# Patient Record
Sex: MALE
[De-identification: names, ages, dates, MRNs, and addresses within clinical notes are randomized per-mention and may not be internally consistent; named-entity substitution may affect disease eponyms.]

---

## 2020-10-19 ENCOUNTER — HOSPITAL ENCOUNTER (OUTPATIENT)
Dept: HOSPITAL 75 - RAD | Age: 45
End: 2020-10-19
Attending: NURSE PRACTITIONER
Payer: SELF-PAY

## 2020-10-19 DIAGNOSIS — N20.0: Primary | ICD-10-CM

## 2020-10-19 PROCEDURE — 74176 CT ABD & PELVIS W/O CONTRAST: CPT

## 2020-10-19 NOTE — DIAGNOSTIC IMAGING REPORT
PROCEDURE: CT abdomen and pelvis without contrast.



TECHNIQUE: Multiple contiguous axial images were obtained through

the abdomen and pelvis without the use of intravenous contrast.

Auto Exposure Controls were utilized during the CT exam to meet

ALARA standards for radiation dose reduction. 



INDICATION:  Flank pain.



FINDINGS: The heart size is normal. The lung bases are clear. The

liver is normal in size without focal lesions. Gallbladder is

unremarkable. There is no biliary ductal dilatation. Spleen is

normal. Pancreas and adrenal glands are unremarkable. There are

several nonobstructing stones in the left kidney. There are no

stones in the right kidney. There is no evidence of obstructive

uropathy. Aorta is nonaneurysmal. Bowel gas pattern is

nonspecific. Appendix is normal. There is no free air. There is

no ascites. There are no focal inflammatory changes. Bladder is

normal. There is no pelvic mass or adenopathy. There are mild

degenerative changes in the spine.



IMPRESSION: Multiple nonobstructing stones in left kidney. There

is, however no evidence of obstructive uropathy.



No other acute abnormality in the abdomen or pelvis.



Dictated by: 



  Dictated on workstation # [id number]